# Patient Record
Sex: MALE | Race: WHITE | URBAN - METROPOLITAN AREA
[De-identification: names, ages, dates, MRNs, and addresses within clinical notes are randomized per-mention and may not be internally consistent; named-entity substitution may affect disease eponyms.]

---

## 2022-01-10 ENCOUNTER — OFFICE VISIT (OUTPATIENT)
Dept: URBAN - METROPOLITAN AREA CLINIC 6 | Facility: CLINIC | Age: 67
End: 2022-01-10
Payer: COMMERCIAL

## 2022-01-10 DIAGNOSIS — H40.013 OPEN ANGLE WITH BORDERLINE FINDINGS, LOW RISK, BILATERAL: Primary | ICD-10-CM

## 2022-01-10 DIAGNOSIS — H11.823 CONJUNCTIVOCHALASIS, BILATERAL: ICD-10-CM

## 2022-01-10 DIAGNOSIS — H35.373 PUCKERING OF MACULA, BILATERAL: ICD-10-CM

## 2022-01-10 DIAGNOSIS — H04.123 TEAR FILM INSUFFICIENCY OF BILATERAL LACRIMAL GLANDS: ICD-10-CM

## 2022-01-10 DIAGNOSIS — H11.053 PERIPHERAL PTERYGIUM, STATIONARY, BILATERAL: ICD-10-CM

## 2022-01-10 DIAGNOSIS — E11.3553 DIABETES MELLITUS TYPE 2 WITH STABLE PROLIFERATIVE RETINOPATHY, BILATERAL: ICD-10-CM

## 2022-01-10 PROCEDURE — 92014 COMPRE OPH EXAM EST PT 1/>: CPT | Performed by: OPTOMETRIST

## 2022-01-10 PROCEDURE — 92250 FUNDUS PHOTOGRAPHY W/I&R: CPT | Performed by: OPTOMETRIST

## 2022-01-10 RX ORDER — CINACALCET HYDROCHLORIDE 30 MG/1
30 MG TABLET, COATED ORAL
Refills: 0 | Status: ACTIVE
Start: 2022-01-10

## 2022-01-10 RX ORDER — INSULIN LISPRO 100 [IU]/ML
INJECTION, SOLUTION INTRAVENOUS; SUBCUTANEOUS
Refills: 0 | Status: ACTIVE
Start: 2022-01-10

## 2022-01-10 RX ORDER — CYCLOSPORINE 0 G/ML
0.09 % SOLUTION/ DROPS OPHTHALMIC; TOPICAL
Qty: 180 | Refills: 3 | Status: ACTIVE
Start: 2022-01-10

## 2022-01-10 RX ORDER — CALCIUM ACETATE 667 MG/1
667 MG CAPSULE ORAL
Refills: 0 | Status: ACTIVE
Start: 2022-01-10

## 2022-01-10 RX ORDER — METOPROLOL TARTRATE 25 MG/1
25 MG TABLET, FILM COATED ORAL
Refills: 0 | Status: ACTIVE
Start: 2022-01-10

## 2022-01-10 RX ORDER — LISINOPRIL 40 MG/1
40 MG TABLET ORAL
Refills: 0 | Status: ACTIVE
Start: 2022-01-10

## 2022-01-10 RX ORDER — INSULIN GLARGINE 100 [IU]/ML
INJECTION, SOLUTION SUBCUTANEOUS
Refills: 0 | Status: ACTIVE
Start: 2022-01-10

## 2022-01-10 RX ORDER — ZOLPIDEM TARTRATE 10 MG/1
10 MG TABLET, FILM COATED ORAL
Refills: 0 | Status: ACTIVE
Start: 2022-01-10

## 2022-01-10 ASSESSMENT — INTRAOCULAR PRESSURE
OS: 18
OD: 15

## 2022-01-10 NOTE — IMPRESSION/PLAN
Impression: Diabetes mellitus Type 2 with stable proliferative retinopathy, bilateral: Y14.1346. Plan: Diabetes Mellitus Type II with regressed Proliferative Diabetic Retinopathy both eyes with absence of  macular edema (R37.0193)- Discussed the pathophysiology of diabetes and its effect on the eye. Stressed the importance of strong glucose control. Advised of importance of scheduled dilated examinations, and to contact us immediately for any problems or concerns.

## 2022-01-10 NOTE — IMPRESSION/PLAN
Impression: Tear film insufficiency of bilateral lacrimal glands: H04.123. Plan: Start Cequa BID OU (May substitute for Xiidra BID OU).

## 2022-01-10 NOTE — IMPRESSION/PLAN
Impression: Peripheral pterygium, stationary, bilateral: H11.053. Plan: Pterygium both eyes (H11.003): advised patient to avoid UV exposure with a hat and sunglasses when outdoors. Use artificial tears PRN. Return for any change in size, decreased vision, or other problems or concerns.

## 2022-01-10 NOTE — IMPRESSION/PLAN
Impression: Conjunctivochalasis, bilateral: V07.783. Plan: Recommended frequent lubrication of the ocular surface.

## 2022-09-08 ENCOUNTER — OFFICE VISIT (OUTPATIENT)
Dept: URBAN - METROPOLITAN AREA CLINIC 6 | Facility: CLINIC | Age: 67
End: 2022-09-08
Payer: COMMERCIAL

## 2022-09-08 DIAGNOSIS — H20.011 PRIMARY IRIDOCYCLITIS OF RIGHT EYE: ICD-10-CM

## 2022-09-08 DIAGNOSIS — H18.222 IDIOPATHIC CORNEAL EDEMA, LEFT EYE: Primary | ICD-10-CM

## 2022-09-08 PROCEDURE — 92012 INTRM OPH EXAM EST PATIENT: CPT | Performed by: OPTOMETRIST

## 2022-09-08 RX ORDER — PREDNISOLONE ACETATE 10 MG/ML
1 % SUSPENSION/ DROPS OPHTHALMIC
Qty: 5 | Refills: 0 | Status: ACTIVE
Start: 2022-09-08

## 2022-09-08 RX ORDER — SODIUM CHLORIDE 50 MG/G
5 % OINTMENT OPHTHALMIC
Qty: 3.5 | Refills: 0 | Status: ACTIVE
Start: 2022-09-08

## 2022-09-08 ASSESSMENT — INTRAOCULAR PRESSURE
OS: 16
OD: 11

## 2022-09-08 NOTE — IMPRESSION/PLAN
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 Plan: Start Prednisolone acetate TID OD (shake well).

## 2022-09-12 ENCOUNTER — OFFICE VISIT (OUTPATIENT)
Dept: URBAN - METROPOLITAN AREA CLINIC 6 | Facility: CLINIC | Age: 67
End: 2022-09-12
Payer: COMMERCIAL

## 2022-09-12 DIAGNOSIS — H18.221 IDIOPATHIC CORNEAL EDEMA, RIGHT EYE: ICD-10-CM

## 2022-09-12 DIAGNOSIS — H20.011 PRIMARY IRIDOCYCLITIS OF RIGHT EYE: Primary | ICD-10-CM

## 2022-09-12 PROCEDURE — 92012 INTRM OPH EXAM EST PATIENT: CPT | Performed by: OPTOMETRIST

## 2022-09-12 RX ORDER — VALACYCLOVIR 500 MG/1
500 MG TABLET, FILM COATED ORAL
Qty: 42 | Refills: 0 | Status: ACTIVE
Start: 2022-09-12

## 2022-09-12 ASSESSMENT — INTRAOCULAR PRESSURE
OD: 14
OS: 23

## 2022-09-12 NOTE — IMPRESSION/PLAN
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 Plan: Pt never picked up [facility]. Pt has scarring inferior cornea but does not appear to be active epitheliel disease (no dendrite). And per patient has been struggling with this eye for the last 3 months. Will sent valacyclovir 500 mg po TID in case is herpetic in nature.

## 2022-09-12 NOTE — IMPRESSION/PLAN
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 Plan: Pt feels much better using prednisolone for the iritis. CCRx for now. Recheck in 1-2 weeks.

## 2022-10-05 ENCOUNTER — OFFICE VISIT (OUTPATIENT)
Dept: URBAN - METROPOLITAN AREA CLINIC 6 | Facility: CLINIC | Age: 67
End: 2022-10-05
Payer: COMMERCIAL

## 2022-10-05 DIAGNOSIS — H35.373 PUCKERING OF MACULA, BILATERAL: Primary | ICD-10-CM

## 2022-10-05 DIAGNOSIS — E11.3553 DIABETES MELLITUS TYPE 2 WITH STABLE PROLIFERATIVE RETINOPATHY, BILATERAL: ICD-10-CM

## 2022-10-05 DIAGNOSIS — H20.011 PRIMARY IRIDOCYCLITIS OF RIGHT EYE: ICD-10-CM

## 2022-10-05 PROCEDURE — 92014 COMPRE OPH EXAM EST PT 1/>: CPT | Performed by: OPHTHALMOLOGY

## 2022-10-05 PROCEDURE — 92134 CPTRZ OPH DX IMG PST SGM RTA: CPT | Performed by: OPHTHALMOLOGY

## 2022-10-05 ASSESSMENT — INTRAOCULAR PRESSURE
OS: 16
OD: 16

## 2022-10-05 NOTE — IMPRESSION/PLAN
Impression: Puckering of macula, bilateral: H35.373. Plan: Epiretinal membrane with CME OD - Cont prednisolone QD till bottle runs out.  refer to [facility]

## 2022-10-05 NOTE — IMPRESSION/PLAN
Impression: Diabetes mellitus Type 2 with stable proliferative retinopathy, bilateral: B71.7675. Plan: Diabetes Mellitus Type II with regressed Proliferative Diabetic Retinopathy both eyes with absence of  macular edema (G54.8765)- Discussed the pathophysiology of diabetes and its effect on the eye. Stressed the importance of strong glucose control. Advised of importance of scheduled dilated examinations, and to contact us immediately for any problems or concerns.

## 2023-01-11 ENCOUNTER — OFFICE VISIT (OUTPATIENT)
Dept: URBAN - METROPOLITAN AREA CLINIC 6 | Facility: CLINIC | Age: 68
End: 2023-01-11
Payer: COMMERCIAL

## 2023-01-11 DIAGNOSIS — T85.29XS OTHER MECHANICAL COMPLICATION OF INTRAOCULAR LENS, SEQUELA: ICD-10-CM

## 2023-01-11 DIAGNOSIS — H35.373 PUCKERING OF MACULA, BILATERAL: ICD-10-CM

## 2023-01-11 DIAGNOSIS — E11.3553 DIABETES MELLITUS TYPE 2 WITH STABLE PROLIFERATIVE RETINOPATHY, BILATERAL: Primary | ICD-10-CM

## 2023-01-11 DIAGNOSIS — H18.221 IDIOPATHIC CORNEAL EDEMA, RIGHT EYE: ICD-10-CM

## 2023-01-11 PROCEDURE — 92134 CPTRZ OPH DX IMG PST SGM RTA: CPT | Performed by: OPHTHALMOLOGY

## 2023-01-11 PROCEDURE — 92012 INTRM OPH EXAM EST PATIENT: CPT | Performed by: OPHTHALMOLOGY

## 2023-01-11 ASSESSMENT — INTRAOCULAR PRESSURE
OD: 9
OS: 13

## 2023-01-11 NOTE — IMPRESSION/PLAN
Impression: Diabetes mellitus Type 2 with stable proliferative retinopathy, bilateral: N32.0188. Plan: Diabetes Mellitus Type II with regressed Proliferative Diabetic Retinopathy both eyes with absence of  macular edema- MOCT ordered today. Discussed the pathophysiology of diabetes and its effect on the eye. Stressed the importance of strong glucose control. Advised of importance of scheduled dilated examinations, and to contact us immediately for any problems or concerns.

## 2023-01-11 NOTE — IMPRESSION/PLAN
Impression: Puckering of macula, bilateral: H35.373.  Plan: Epiretinal membrane with CME OD -  refer to [facility] for further evaluation

## 2023-01-11 NOTE — IMPRESSION/PLAN
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 Plan: Has inferior epitheliopathy ou. Start ana merissa BID/AT QID  ou. RTC 1 mo for fu. Once healed can go back to [facility] for IOL explantation OD.

## 2023-01-11 NOTE — IMPRESSION/PLAN
Impression: Other mechanical complication of intraocular lens, sequela: T85.29XS. Plan: Under care of [address] [place] [address]. Planning Sx once K clears.

## 2023-05-01 ENCOUNTER — OFFICE VISIT (OUTPATIENT)
Dept: URBAN - METROPOLITAN AREA CLINIC 6 | Facility: CLINIC | Age: 68
End: 2023-05-01
Payer: COMMERCIAL

## 2023-05-01 DIAGNOSIS — T85.29XS OTHER MECHANICAL COMPLICATION OF INTRAOCULAR LENS, SEQUELA: Primary | ICD-10-CM

## 2023-05-01 PROCEDURE — 92012 INTRM OPH EXAM EST PATIENT: CPT | Performed by: OPTOMETRIST

## 2023-05-01 ASSESSMENT — INTRAOCULAR PRESSURE
OS: 19
OD: 15

## 2023-05-01 NOTE — IMPRESSION/PLAN
Impression: Other mechanical complication of intraocular lens, sequela: T85.29XS. Plan: Cleared by [facility] for surgery , secondary IOL. Referral given for the patient to go see Dr. Joe Arana.